# Patient Record
Sex: MALE | Race: BLACK OR AFRICAN AMERICAN | NOT HISPANIC OR LATINO
[De-identification: names, ages, dates, MRNs, and addresses within clinical notes are randomized per-mention and may not be internally consistent; named-entity substitution may affect disease eponyms.]

---

## 2020-09-17 PROBLEM — Z00.00 ENCOUNTER FOR PREVENTIVE HEALTH EXAMINATION: Status: ACTIVE | Noted: 2020-09-17

## 2020-10-06 ENCOUNTER — APPOINTMENT (OUTPATIENT)
Dept: GASTROENTEROLOGY | Facility: CLINIC | Age: 55
End: 2020-10-06
Payer: MEDICAID

## 2020-10-06 VITALS
HEART RATE: 74 BPM | HEIGHT: 76 IN | RESPIRATION RATE: 15 BRPM | DIASTOLIC BLOOD PRESSURE: 77 MMHG | SYSTOLIC BLOOD PRESSURE: 110 MMHG | WEIGHT: 215 LBS | OXYGEN SATURATION: 98 % | TEMPERATURE: 97.7 F | BODY MASS INDEX: 26.18 KG/M2

## 2020-10-06 DIAGNOSIS — Z86.010 PERSONAL HISTORY OF COLONIC POLYPS: ICD-10-CM

## 2020-10-06 DIAGNOSIS — Z72.89 OTHER PROBLEMS RELATED TO LIFESTYLE: ICD-10-CM

## 2020-10-06 PROCEDURE — 99203 OFFICE O/P NEW LOW 30 MIN: CPT

## 2020-10-06 NOTE — END OF VISIT
[] : Fellow [FreeTextEntry3] : Pt seen and examined, case d/w Dr. Johnson.  Pt has an AC polyp, will arrange for an EMR at [facility]. Risks including perforation and bleeding d/w the pt.

## 2020-10-06 NOTE — PHYSICAL EXAM
[General Appearance - Alert] : alert [General Appearance - In No Acute Distress] : in no acute distress [General Appearance - Well Nourished] : well nourished [General Appearance - Well Developed] : well developed [General Appearance - Well-Appearing] : healthy appearing [PERRL With Normal Accommodation] : pupils were equal in size, round, and reactive to light [Extraocular Movements] : extraocular movements were intact [Hearing Threshold Finger Rub Not Love] : hearing was normal [Examination Of The Oral Cavity] : the lips and gums were normal [Neck Cervical Mass (___cm)] : no neck mass was observed [Respiration, Rhythm And Depth] : normal respiratory rhythm and effort [Exaggerated Use Of Accessory Muscles For Inspiration] : no accessory muscle use [Heart Rate And Rhythm] : heart rate was normal and rhythm regular [Edema] : there was no peripheral edema [Veins - Varicosity Changes] : there were no varicosital changes [Abdomen Soft] : soft [Abdomen Tenderness] : non-tender [Abdomen Mass (___ Cm)] : no abdominal mass palpated [Involuntary Movements] : no involuntary movements were seen [Skin Turgor] : normal skin turgor [] : no rash [No Focal Deficits] : no focal deficits [Oriented To Time, Place, And Person] : oriented to person, place, and time [Impaired Insight] : insight and judgment were intact [Affect] : the affect was normal [Mood] : the mood was normal

## 2020-10-06 NOTE — HISTORY OF PRESENT ILLNESS
[de-identified] : Patient is a 54yM with no reported PMHx referred by Dr. Jerome for a complex polypectomy. Patient recently underwent his index screening colonoscopy on 9/16/2020 with removal of two small tubular adenomas and one 7-8cm sessile but mobile lesion in the ascending colon which was unable to be removed. The lesion is noted to be soft with superficial biopsies revealing tubular adenoma. Patient reports feeling well without GI complaints. No melena, hematochezia, weight changes. Patient used to be employed in [place] with a company that [employer], but lost his job early in the pandemic and returned to [place]. No known family history of GI malignancy, non-smoker, rare EtOH.

## 2020-10-06 NOTE — ASSESSMENT
[FreeTextEntry1] : 54yM referred by Dr. Jerome for complex polypectomy of ascending colon polyp\par \par 1. Ascending colon polyp - 7-8 cm lesion in the ascending colon, soft, mobile, with superficial biopsies revealing tubular adenoma seen on patient's index colonoscopy on 9/16/2020.\par - Discussed r/b/a/i of colonoscopic evaluation and likely EMR, patient amenable\par - Discussed need for bowel preparation, escort, preprocedural COVID testing, possible postprocedural hospitalization\par - Will plan for colonoscopy at [facility] with MiraLAX prep\par \par RTC as needed or after colonoscopy\par Patient seen and case discussed with attending physician

## 2020-11-05 ENCOUNTER — INPATIENT (INPATIENT)
Facility: HOSPITAL | Age: 55
LOS: 2 days | Discharge: HOME | DRG: 909 | End: 2020-11-08
Attending: SURGERY | Admitting: SURGERY
Payer: MEDICAID

## 2020-11-05 ENCOUNTER — APPOINTMENT (OUTPATIENT)
Dept: GASTROENTEROLOGY | Facility: HOSPITAL | Age: 55
End: 2020-11-05

## 2020-11-05 VITALS
DIASTOLIC BLOOD PRESSURE: 63 MMHG | OXYGEN SATURATION: 98 % | RESPIRATION RATE: 16 BRPM | TEMPERATURE: 96 F | HEART RATE: 94 BPM | SYSTOLIC BLOOD PRESSURE: 107 MMHG

## 2020-11-05 LAB
ANION GAP SERPL CALC-SCNC: 8 MMOL/L — SIGNIFICANT CHANGE UP (ref 5–17)
APTT BLD: 28.4 SEC — SIGNIFICANT CHANGE UP (ref 27.5–35.5)
BASE EXCESS BLDA CALC-SCNC: -1.2 MMOL/L — SIGNIFICANT CHANGE UP (ref -2–3)
BASE EXCESS BLDA CALC-SCNC: -5 MMOL/L — LOW (ref -2–3)
BLD GP AB SCN SERPL QL: NEGATIVE — SIGNIFICANT CHANGE UP
BUN SERPL-MCNC: 8 MG/DL — SIGNIFICANT CHANGE UP (ref 7–23)
CA-I BLDA-SCNC: 1.04 MMOL/L — LOW (ref 1.12–1.3)
CALCIUM SERPL-MCNC: 7.8 MG/DL — LOW (ref 8.4–10.5)
CHLORIDE SERPL-SCNC: 105 MMOL/L — SIGNIFICANT CHANGE UP (ref 96–108)
CO2 SERPL-SCNC: 23 MMOL/L — SIGNIFICANT CHANGE UP (ref 22–31)
COHGB MFR BLDA: 0.3 % — SIGNIFICANT CHANGE UP
CREAT SERPL-MCNC: 0.96 MG/DL — SIGNIFICANT CHANGE UP (ref 0.5–1.3)
GLUCOSE SERPL-MCNC: 123 MG/DL — HIGH (ref 70–99)
HCO3 BLDA-SCNC: 24 MMOL/L — SIGNIFICANT CHANGE UP (ref 21–28)
HCO3 BLDA-SCNC: 24 MMOL/L — SIGNIFICANT CHANGE UP (ref 21–28)
HCT VFR BLD CALC: 34.7 % — LOW (ref 39–50)
HCT VFR BLD CALC: 35.1 % — LOW (ref 39–50)
HCT VFR BLD CALC: 45.5 % — SIGNIFICANT CHANGE UP (ref 39–50)
HGB BLD-MCNC: 11.5 G/DL — LOW (ref 13–17)
HGB BLD-MCNC: 11.7 G/DL — LOW (ref 13–17)
HGB BLD-MCNC: 14.6 G/DL — SIGNIFICANT CHANGE UP (ref 13–17)
HGB BLDA-MCNC: 11.7 G/DL — LOW (ref 13–17)
INR BLD: 1.14 — SIGNIFICANT CHANGE UP (ref 0.88–1.16)
MAGNESIUM SERPL-MCNC: 1.5 MG/DL — LOW (ref 1.6–2.6)
MCHC RBC-ENTMCNC: 27.9 PG — SIGNIFICANT CHANGE UP (ref 27–34)
MCHC RBC-ENTMCNC: 28.6 PG — SIGNIFICANT CHANGE UP (ref 27–34)
MCHC RBC-ENTMCNC: 29 PG — SIGNIFICANT CHANGE UP (ref 27–34)
MCHC RBC-ENTMCNC: 32.1 GM/DL — SIGNIFICANT CHANGE UP (ref 32–36)
MCHC RBC-ENTMCNC: 33.1 GM/DL — SIGNIFICANT CHANGE UP (ref 32–36)
MCHC RBC-ENTMCNC: 33.3 GM/DL — SIGNIFICANT CHANGE UP (ref 32–36)
MCV RBC AUTO: 85.8 FL — SIGNIFICANT CHANGE UP (ref 80–100)
MCV RBC AUTO: 86.8 FL — SIGNIFICANT CHANGE UP (ref 80–100)
MCV RBC AUTO: 87.4 FL — SIGNIFICANT CHANGE UP (ref 80–100)
METHGB MFR BLDA: 0.3 % — SIGNIFICANT CHANGE UP
NRBC # BLD: 0 /100 WBCS — SIGNIFICANT CHANGE UP (ref 0–0)
O2 CT VFR BLDA CALC: 17.4 ML/DL — SIGNIFICANT CHANGE UP (ref 15–23)
OXYHGB MFR BLDA: 99 % — SIGNIFICANT CHANGE UP (ref 94–100)
PCO2 BLDA: 45 MMHG — SIGNIFICANT CHANGE UP (ref 35–48)
PCO2 BLDA: 64 MMHG — CRITICAL HIGH (ref 35–48)
PH BLDA: 7.19 — CRITICAL LOW (ref 7.35–7.45)
PH BLDA: 7.36 — SIGNIFICANT CHANGE UP (ref 7.35–7.45)
PLATELET # BLD AUTO: 181 K/UL — SIGNIFICANT CHANGE UP (ref 150–400)
PLATELET # BLD AUTO: 200 K/UL — SIGNIFICANT CHANGE UP (ref 150–400)
PLATELET # BLD AUTO: 299 K/UL — SIGNIFICANT CHANGE UP (ref 150–400)
PO2 BLDA: 415 MMHG — HIGH (ref 83–108)
PO2 BLDA: 85 MMHG — SIGNIFICANT CHANGE UP (ref 83–108)
POTASSIUM BLDA-SCNC: 4.7 MMOL/L — SIGNIFICANT CHANGE UP (ref 3.5–4.9)
POTASSIUM SERPL-MCNC: 4.3 MMOL/L — SIGNIFICANT CHANGE UP (ref 3.5–5.3)
POTASSIUM SERPL-SCNC: 4.3 MMOL/L — SIGNIFICANT CHANGE UP (ref 3.5–5.3)
PROTHROM AB SERPL-ACNC: 13.6 SEC — SIGNIFICANT CHANGE UP (ref 10.6–13.6)
RBC # BLD: 3.97 M/UL — LOW (ref 4.2–5.8)
RBC # BLD: 4.09 M/UL — LOW (ref 4.2–5.8)
RBC # BLD: 5.24 M/UL — SIGNIFICANT CHANGE UP (ref 4.2–5.8)
RBC # FLD: 16.1 % — HIGH (ref 10.3–14.5)
RBC # FLD: 16.1 % — HIGH (ref 10.3–14.5)
RBC # FLD: 16.8 % — HIGH (ref 10.3–14.5)
RH IG SCN BLD-IMP: POSITIVE — SIGNIFICANT CHANGE UP
SAO2 % BLDA: 100 % — SIGNIFICANT CHANGE UP (ref 95–100)
SAO2 % BLDA: 93 % — LOW (ref 95–100)
SARS-COV-2 N GENE NPH QL NAA+PROBE: NOT DETECTED
SODIUM BLDA-SCNC: 128 MMOL/L — LOW (ref 138–146)
SODIUM SERPL-SCNC: 136 MMOL/L — SIGNIFICANT CHANGE UP (ref 135–145)
WBC # BLD: 10.39 K/UL — SIGNIFICANT CHANGE UP (ref 3.8–10.5)
WBC # BLD: 5.66 K/UL — SIGNIFICANT CHANGE UP (ref 3.8–10.5)
WBC # BLD: 7.69 K/UL — SIGNIFICANT CHANGE UP (ref 3.8–10.5)
WBC # FLD AUTO: 10.39 K/UL — SIGNIFICANT CHANGE UP (ref 3.8–10.5)
WBC # FLD AUTO: 5.66 K/UL — SIGNIFICANT CHANGE UP (ref 3.8–10.5)
WBC # FLD AUTO: 7.69 K/UL — SIGNIFICANT CHANGE UP (ref 3.8–10.5)

## 2020-11-05 PROCEDURE — 45385 COLONOSCOPY W/LESION REMOVAL: CPT

## 2020-11-05 PROCEDURE — 99223 1ST HOSP IP/OBS HIGH 75: CPT | Mod: GC,25

## 2020-11-05 PROCEDURE — 99222 1ST HOSP IP/OBS MODERATE 55: CPT | Mod: GC

## 2020-11-05 PROCEDURE — 36248 INS CATH ABD/L-EXT ART ADDL: CPT

## 2020-11-05 PROCEDURE — 37244 VASC EMBOLIZE/OCCLUDE BLEED: CPT

## 2020-11-05 PROCEDURE — 36247 INS CATH ABD/L-EXT ART 3RD: CPT

## 2020-11-05 RX ORDER — ALBUMIN HUMAN 25 %
250 VIAL (ML) INTRAVENOUS ONCE
Refills: 0 | Status: DISCONTINUED | OUTPATIENT
Start: 2020-11-05 | End: 2020-11-06

## 2020-11-05 RX ORDER — INFLUENZA VIRUS VACCINE 15; 15; 15; 15 UG/.5ML; UG/.5ML; UG/.5ML; UG/.5ML
0.5 SUSPENSION INTRAMUSCULAR ONCE
Refills: 0 | Status: DISCONTINUED | OUTPATIENT
Start: 2020-11-05 | End: 2020-11-08

## 2020-11-05 RX ORDER — CHLORHEXIDINE GLUCONATE 213 G/1000ML
1 SOLUTION TOPICAL
Refills: 0 | Status: DISCONTINUED | OUTPATIENT
Start: 2020-11-05 | End: 2020-11-08

## 2020-11-05 RX ORDER — SODIUM CHLORIDE 9 MG/ML
1000 INJECTION, SOLUTION INTRAVENOUS
Refills: 0 | Status: DISCONTINUED | OUTPATIENT
Start: 2020-11-05 | End: 2020-11-06

## 2020-11-05 RX ORDER — MAGNESIUM SULFATE 500 MG/ML
2 VIAL (ML) INJECTION ONCE
Refills: 0 | Status: COMPLETED | OUTPATIENT
Start: 2020-11-05 | End: 2020-11-05

## 2020-11-05 RX ADMIN — SODIUM CHLORIDE 130 MILLILITER(S): 9 INJECTION, SOLUTION INTRAVENOUS at 19:28

## 2020-11-05 RX ADMIN — Medication 50 GRAM(S): at 20:24

## 2020-11-05 NOTE — H&P ADULT - NSHPLABSRESULTS_GEN_ALL_CORE
LABS:                        11.7   7.69  )-----------( 181      ( 05 Nov 2020 18:16 )             35.1     11-05    136  |  105  |  8   ----------------------------<  123<H>  4.3   |  23  |  0.96    Ca    7.8<L>      05 Nov 2020 18:16  Mg     1.5     11-05      PT/INR - ( 05 Nov 2020 18:16 )   PT: 13.6 sec;   INR: 1.14          PTT - ( 05 Nov 2020 18:16 )  PTT:28.4 sec      RADIOLOGY & ADDITIONAL STUDIES:

## 2020-11-05 NOTE — H&P ADULT - NSHPPHYSICALEXAM_GEN_ALL_CORE
Vital Signs Last 24 Hrs  T(C): 37.3 (05 Nov 2020 22:27), Max: 37.3 (05 Nov 2020 22:27)  T(F): 99.2 (05 Nov 2020 22:27), Max: 99.2 (05 Nov 2020 22:27)  HR: 98 (05 Nov 2020 22:00) (86 - 98)  BP: 97/66 (05 Nov 2020 19:11) (88/57 - 107/63)  BP(mean): 77 (05 Nov 2020 19:11) (65 - 79)  RR: 18 (05 Nov 2020 22:00) (14 - 28)  SpO2: 99% (05 Nov 2020 22:00) (97% - 100%)    PHYSICAL EXAM:  General: NAD, resting comfortably in bed  C/V: NSR  Pulm: Nonlabored breathing, no respiratory distress  Abd: soft, non-tender, non-distended, no rebound or guarding   Extrem: WWP, no edema, sheath site w/o hematoma  Pulses: palpable distal pulses

## 2020-11-05 NOTE — CONSULT NOTE ADULT - SUBJECTIVE AND OBJECTIVE BOX
GASTROENTEROLOGY CONSULT NOTE  HPI:  53 y/o M no significant pmh presented for complex polypectomy c/b brisk hemorrhage. Patient diagnosed with 7-8 cm polyp diagnosed on outpatient colonoscopy and presented for polypectomy. During the procedure the patient developed brisk hemorrhage at the polypectomy site. 1.5 L of bright blood noted in the suction canister. Pressure dropped significantly to 70s systolic, was promptly bolused w/o response and placed on phenylephrine. Became bradycardic to 20s, received atropine w/ return to HR in 70s and placed on Phenylephrine drip. CBC drawn relieved Hgb 14.6 however received 1 unit of PRBC given severe blood loss. Was intubated, areas of bleeding clipped and promptly transferred to IR for urgent embolization of bleeding noted in the right colon. After the procedure he no longer required pressors, was extubated, and transferred to the PACU.   (05 Nov 2020 17:46)    Allergies    No Known Allergies    Intolerances      Home Medications:    MEDICATIONS:  MEDICATIONS  (STANDING):  albumin human  5% IVPB 250 milliLiter(s) IV Intermittent Once  chlorhexidine 4% Liquid 1 Application(s) Topical <User Schedule>  influenza   Vaccine 0.5 milliLiter(s) IntraMuscular once  lactated ringers. 1000 milliLiter(s) (130 mL/Hr) IV Continuous <Continuous>    MEDICATIONS  (PRN):    PAST MEDICAL & SURGICAL HISTORY:    FAMILY HISTORY:    SOCIAL HISTORY:  As above    REVIEW OF SYSTEMS:  CONSTITUTIONAL: No weakness, fevers or chills  HEENT: No visual changes; No vertigo or throat pain   NECK: No pain or stiffness  RESPIRATORY: No cough, wheezing, hemoptysis; No shortness of breath  CARDIOVASCULAR: No chest pain or palpitations  GASTROINTESTINAL: As above  GENITOURINARY: No dysuria, frequency or hematuria  NEUROLOGICAL: No numbness or weakness  SKIN: No itching, burning, rashes, or lesions   All other 10 review of systems is negative unless indicated above.    Vital Signs Last 24 Hrs  T(C): 36.8 (05 Nov 2020 19:11), Max: 36.8 (05 Nov 2020 19:11)  T(F): 98.3 (05 Nov 2020 19:11), Max: 98.3 (05 Nov 2020 19:11)  HR: 90 (05 Nov 2020 21:00) (86 - 96)  BP: 97/66 (05 Nov 2020 19:11) (88/57 - 107/63)  BP(mean): 77 (05 Nov 2020 19:11) (65 - 79)  RR: 20 (05 Nov 2020 21:00) (14 - 28)  SpO2: 99% (05 Nov 2020 21:00) (97% - 100%)    11-05 @ 07:01  -  11-05 @ 21:11  --------------------------------------------------------  IN: 440 mL / OUT: 1450 mL / NET: -1010 mL        PHYSICAL EXAM:    General: Patient intubated and sedated at time of examination  Eyes: Anicteric sclerae, moist conjunctivae  HENT: ETT in place  Neck: Trachea midline, supple  Lungs: Normal respiratory effort, no intercostal retractions  Cardiovascular: RRR  Abdomen: Soft, non-tender non-distended; Normal bowel sounds; No rebound or guarding  Extremities: Normal range of motion, No clubbing, cyanosis or edema  Neurological: Sedated  Skin: Warm and dry. No obvious rash    LABS:                        11.7   7.69  )-----------( 181      ( 05 Nov 2020 18:16 )             35.1     11-05    136  |  105  |  8   ----------------------------<  123<H>  4.3   |  23  |  0.96    Ca    7.8<L>      05 Nov 2020 18:16  Mg     1.5     11-05          PT/INR - ( 05 Nov 2020 18:16 )   PT: 13.6 sec;   INR: 1.14          PTT - ( 05 Nov 2020 18:16 )  PTT:28.4 sec    RADIOLOGY & ADDITIONAL STUDIES:     Reviewed

## 2020-11-05 NOTE — CONSULT NOTE ADULT - ASSESSMENT
54yM who presented for outpatient complex polypectomy c/b acute hemorrhage/hemorrhagic shock s/p embolization with IR    1. Lower GI hemorrhage - Patient with large pedunculated ascending colon polyp with acute hemorrhage after attempted polypectomy. Several endoscopic hemoclips placed, but due to clinical deterioration and obscured view, IR and Colorectal Surgery consulted. Patient taken to IR for emergent angiography which demonstrated a blush in the ascending colon. Successful coil embolization performed.  - Maintain active T&S, large bore IV access  - Transfusion threshold per primary team    Recommendations discussed with primary team  Case discussed with attending physician

## 2020-11-05 NOTE — CONSULT NOTE ADULT - SUBJECTIVE AND OBJECTIVE BOX
HPI:  55 y/o M no significant pmh presented for complex polypectomy c/b brisk hemorrhage. Patient diagnosed with 7-8 cm polyp diagnosed on outpatient colonoscopy and presented for polypectomy. During the procedure the patient developed brisk hemorrhage at the polypectomy site. 1.5 L of bright blood noted in the suction canister. Pressure dropped significantly to 70s systolic, was promptly bolused w/o response and placed on phenylephrine. Became bradycardic to 20s, received atropine w/ return to HR in 70s and placed on Phenylephrine drip. CBC drawn relieved Hgb 14.6 however received 1 unit of PRBC given severe blood loss. Was intubated, areas of bleeding clipped and promptly transferred to IR for urgent embolization of bleeding noted in the right colon. After the procedure he no longer required pressors, was extubated, and transferred to the PACU.   (05 Nov 2020 17:46)      PAST MEDICAL & SURGICAL HISTORY:      ROS: See HPI    MEDICATIONS  (STANDING):  albumin human  5% IVPB 250 milliLiter(s) IV Intermittent Once    MEDICATIONS  (PRN):      Allergies    No Known Allergies    Intolerances        SOCIAL HISTORY:  Smoke: Never Smoker  EtOH: occasional    FAMILY HISTORY:      Vital Signs Last 24 Hrs  T(C): 35.5 (05 Nov 2020 17:45), Max: 35.5 (05 Nov 2020 17:45)  T(F): 95.9 (05 Nov 2020 17:45), Max: 95.9 (05 Nov 2020 17:45)  HR: 90 (05 Nov 2020 17:45) (90 - 96)  BP: 89/60 (05 Nov 2020 17:45) (89/60 - 107/63)  BP(mean): 69 (05 Nov 2020 17:45) (69 - 79)  RR: 16 (05 Nov 2020 17:45) (14 - 19)  SpO2: 100% (05 Nov 2020 17:45) (97% - 100%)    PHYSICAL EXAM    Gen: NAD   Neuro:   HEENT:   CV: RRR reg s1s2 no M  Pulm: CTA B/L no w/w/r  Abd: Soft, NT/ND  Ext: No C/C/E  Skin: No rashes erythema or ecchymosis  MSK: No joint swelling noted  Psych: Normal affect     LABS:                        14.6   5.66  )-----------( 299      ( 05 Nov 2020 14:52 )             45.5                 RADIOLOGY & ADDITIONAL STUDIES:    Assessment and Plan:  54Mwith no PMH, presented for elective polypectomy. During the procedure he had brisk bleed approx 1.5 L, became hypotensive, bolused 500cc, given 1u of PRBCs, started on phenylephrine and was intubated. Sent to IR. Admitted to SICU for hemodynamic monitoring    Neuro: no pain meds   CV: HD stable Repeat cbc q 6   Pulm: Satting well on NC  GI: NPO LGIB post polypectomy s/p IR embolization.   : Voids   ID: None  Endo: ISS   ppx: scd No SQH   Lines: PIV   Wounds: None   PT/OT: Not ordered

## 2020-11-05 NOTE — H&P ADULT - ASSESSMENT
55 y/o M no significant pmh s/p Colonoscopy w/ polypectomy c/b acute hemorrhage s/p IR coil embolization w/ blush noted in ascending colon. Extubated and clinically stable.     Admit to Dr. Storm, Team 1, ICU  NPO/IVF  Q6hr CBC  Active Type and Screen/ Large Bore IV  Transfuse to Hg > 7  Plan discussed with attending and chief resident

## 2020-11-06 LAB
ANION GAP SERPL CALC-SCNC: 10 MMOL/L — SIGNIFICANT CHANGE UP (ref 5–17)
BUN SERPL-MCNC: 8 MG/DL — SIGNIFICANT CHANGE UP (ref 7–23)
CALCIUM SERPL-MCNC: 8.2 MG/DL — LOW (ref 8.4–10.5)
CHLORIDE SERPL-SCNC: 104 MMOL/L — SIGNIFICANT CHANGE UP (ref 96–108)
CO2 SERPL-SCNC: 24 MMOL/L — SIGNIFICANT CHANGE UP (ref 22–31)
CREAT SERPL-MCNC: 0.83 MG/DL — SIGNIFICANT CHANGE UP (ref 0.5–1.3)
GLUCOSE SERPL-MCNC: 85 MG/DL — SIGNIFICANT CHANGE UP (ref 70–99)
HCT VFR BLD CALC: 31.5 % — LOW (ref 39–50)
HCT VFR BLD CALC: 31.9 % — LOW (ref 39–50)
HCT VFR BLD CALC: 32.2 % — LOW (ref 39–50)
HCT VFR BLD CALC: 32.3 % — LOW (ref 39–50)
HGB BLD-MCNC: 10.4 G/DL — LOW (ref 13–17)
HGB BLD-MCNC: 10.4 G/DL — LOW (ref 13–17)
HGB BLD-MCNC: 10.6 G/DL — LOW (ref 13–17)
HGB BLD-MCNC: 10.6 G/DL — LOW (ref 13–17)
MAGNESIUM SERPL-MCNC: 1.9 MG/DL — SIGNIFICANT CHANGE UP (ref 1.6–2.6)
MCHC RBC-ENTMCNC: 28.4 PG — SIGNIFICANT CHANGE UP (ref 27–34)
MCHC RBC-ENTMCNC: 28.8 PG — SIGNIFICANT CHANGE UP (ref 27–34)
MCHC RBC-ENTMCNC: 29.1 PG — SIGNIFICANT CHANGE UP (ref 27–34)
MCHC RBC-ENTMCNC: 29.1 PG — SIGNIFICANT CHANGE UP (ref 27–34)
MCHC RBC-ENTMCNC: 32.2 GM/DL — SIGNIFICANT CHANGE UP (ref 32–36)
MCHC RBC-ENTMCNC: 32.9 GM/DL — SIGNIFICANT CHANGE UP (ref 32–36)
MCHC RBC-ENTMCNC: 33 GM/DL — SIGNIFICANT CHANGE UP (ref 32–36)
MCHC RBC-ENTMCNC: 33.2 GM/DL — SIGNIFICANT CHANGE UP (ref 32–36)
MCV RBC AUTO: 87.5 FL — SIGNIFICANT CHANGE UP (ref 80–100)
MCV RBC AUTO: 87.6 FL — SIGNIFICANT CHANGE UP (ref 80–100)
MCV RBC AUTO: 88.2 FL — SIGNIFICANT CHANGE UP (ref 80–100)
MCV RBC AUTO: 88.3 FL — SIGNIFICANT CHANGE UP (ref 80–100)
NRBC # BLD: 0 /100 WBCS — SIGNIFICANT CHANGE UP (ref 0–0)
PHOSPHATE SERPL-MCNC: 3.3 MG/DL — SIGNIFICANT CHANGE UP (ref 2.5–4.5)
PLATELET # BLD AUTO: 187 K/UL — SIGNIFICANT CHANGE UP (ref 150–400)
PLATELET # BLD AUTO: 193 K/UL — SIGNIFICANT CHANGE UP (ref 150–400)
PLATELET # BLD AUTO: 195 K/UL — SIGNIFICANT CHANGE UP (ref 150–400)
PLATELET # BLD AUTO: 210 K/UL — SIGNIFICANT CHANGE UP (ref 150–400)
POTASSIUM SERPL-MCNC: 4 MMOL/L — SIGNIFICANT CHANGE UP (ref 3.5–5.3)
POTASSIUM SERPL-SCNC: 4 MMOL/L — SIGNIFICANT CHANGE UP (ref 3.5–5.3)
RBC # BLD: 3.57 M/UL — LOW (ref 4.2–5.8)
RBC # BLD: 3.64 M/UL — LOW (ref 4.2–5.8)
RBC # BLD: 3.66 M/UL — LOW (ref 4.2–5.8)
RBC # BLD: 3.68 M/UL — LOW (ref 4.2–5.8)
RBC # FLD: 16.2 % — HIGH (ref 10.3–14.5)
RBC # FLD: 16.2 % — HIGH (ref 10.3–14.5)
RBC # FLD: 16.3 % — HIGH (ref 10.3–14.5)
RBC # FLD: 16.4 % — HIGH (ref 10.3–14.5)
SARS-COV-2 IGG SERPL QL IA: POSITIVE
SARS-COV-2 IGM SERPL IA-ACNC: 98.2 INDEX — HIGH
SODIUM SERPL-SCNC: 138 MMOL/L — SIGNIFICANT CHANGE UP (ref 135–145)
WBC # BLD: 10.75 K/UL — HIGH (ref 3.8–10.5)
WBC # BLD: 11.71 K/UL — HIGH (ref 3.8–10.5)
WBC # BLD: 12.22 K/UL — HIGH (ref 3.8–10.5)
WBC # BLD: 9.44 K/UL — SIGNIFICANT CHANGE UP (ref 3.8–10.5)
WBC # FLD AUTO: 10.75 K/UL — HIGH (ref 3.8–10.5)
WBC # FLD AUTO: 11.71 K/UL — HIGH (ref 3.8–10.5)
WBC # FLD AUTO: 12.22 K/UL — HIGH (ref 3.8–10.5)
WBC # FLD AUTO: 9.44 K/UL — SIGNIFICANT CHANGE UP (ref 3.8–10.5)

## 2020-11-06 PROCEDURE — 99233 SBSQ HOSP IP/OBS HIGH 50: CPT | Mod: GC

## 2020-11-06 PROCEDURE — 71045 X-RAY EXAM CHEST 1 VIEW: CPT | Mod: 26

## 2020-11-06 RX ADMIN — SODIUM CHLORIDE 130 MILLILITER(S): 9 INJECTION, SOLUTION INTRAVENOUS at 06:56

## 2020-11-06 NOTE — PROGRESS NOTE ADULT - SUBJECTIVE AND OBJECTIVE BOX
INTERVAL/OVERNIGHT EVENTS:    11/6: another episode of dark-colored blood per rectum at 4am.CBC@noon ordered  O/N: had an episode of dark-colored blood, repeat CBC @ MN 11.5 (11.7)   11/5: Admitted to SICU after IR embolization    SUBJECTIVE: Pt reports 2 dark red BMs last night. Today has no complaints. No dizziness, headache, SOB, pain, nausea, vomiting.    Neurologic Medications    Respiratory Medications    Cardiovascular Medications    Gastrointestinal Medications  albumin human  5% IVPB 250 milliLiter(s) IV Intermittent Once  lactated ringers. 1000 milliLiter(s) IV Continuous <Continuous>    Genitourinary Medications    Hematologic/Oncologic Medications  influenza   Vaccine 0.5 milliLiter(s) IntraMuscular once    Antimicrobial/Immunologic Medications    Endocrine/Metabolic Medications    Topical/Other Medications  chlorhexidine 4% Liquid 1 Application(s) Topical <User Schedule>      MEDICATIONS  (PRN):      I&O's Detail    05 Nov 2020 07:01  -  06 Nov 2020 07:00  --------------------------------------------------------  IN:    IV PiggyBack: 50 mL    Lactated Ringers: 1690 mL  Total IN: 1740 mL    OUT:    Voided (mL): 2675 mL  Total OUT: 2675 mL    Total NET: -935 mL      06 Nov 2020 07:01  -  06 Nov 2020 11:58  --------------------------------------------------------  IN:    Lactated Ringers: 390 mL  Total IN: 390 mL    OUT:    Voided (mL): 300 mL  Total OUT: 300 mL    Total NET: 90 mL          Vital Signs Last 24 Hrs  T(C): 37.1 (06 Nov 2020 11:00), Max: 37.3 (05 Nov 2020 22:27)  T(F): 98.7 (06 Nov 2020 11:00), Max: 99.2 (05 Nov 2020 22:27)  HR: 96 (06 Nov 2020 11:00) (86 - 98)  BP: 111/67 (06 Nov 2020 11:00) (88/57 - 111/67)  BP(mean): 83 (06 Nov 2020 11:00) (65 - 83)  RR: 20 (06 Nov 2020 11:00) (14 - 28)  SpO2: 98% (06 Nov 2020 11:00) (95% - 100%)    GENERAL: NAD, resting comfortably in bed  HEENT: NCAT, MMM  C/V: Normal rate, normal peripheral perfusion  PULM: Nonlabored breathing, no respiratory distress,   ABD: Soft, ND, NT, no rebound tenderness, no guarding  EXTREM: WWP, no edema, SCDs in place  NEURO: No focal deficits    LABS:                        10.4   10.75 )-----------( 187      ( 06 Nov 2020 10:59 )             32.3     11-06    138  |  104  |  8   ----------------------------<  85  4.0   |  24  |  0.83    Ca    8.2<L>      06 Nov 2020 05:36  Phos  3.3     11-06  Mg     1.9     11-06      PT/INR - ( 05 Nov 2020 18:16 )   PT: 13.6 sec;   INR: 1.14          PTT - ( 05 Nov 2020 18:16 )  PTT:28.4 sec      RADIOLOGY & ADDITIONAL STUDIES:     INTERVAL/OVERNIGHT EVENTS:    11/6: another episode of dark-colored blood per rectum at 4am.CBC@noon ordered  O/N: had an episode of dark-colored blood, repeat CBC @ MN 11.5 (11.7)   11/5: Admitted to SICU after IR embolization    SUBJECTIVE: Pt reports 2 dark red BMs last night. Today has no complaints. No dizziness, headache, SOB, pain, nausea, vomiting.    Neurologic Medications    Respiratory Medications    Cardiovascular Medications    Gastrointestinal Medications  albumin human  5% IVPB 250 milliLiter(s) IV Intermittent Once  lactated ringers. 1000 milliLiter(s) IV Continuous <Continuous>    Genitourinary Medications    Hematologic/Oncologic Medications  influenza   Vaccine 0.5 milliLiter(s) IntraMuscular once    Antimicrobial/Immunologic Medications    Endocrine/Metabolic Medications    Topical/Other Medications  chlorhexidine 4% Liquid 1 Application(s) Topical <User Schedule>      MEDICATIONS  (PRN):      I&O's Detail    05 Nov 2020 07:01  -  06 Nov 2020 07:00  --------------------------------------------------------  IN:    IV PiggyBack: 50 mL    Lactated Ringers: 1690 mL  Total IN: 1740 mL    OUT:    Voided (mL): 2675 mL  Total OUT: 2675 mL    Total NET: -935 mL      06 Nov 2020 07:01  -  06 Nov 2020 11:58  --------------------------------------------------------  IN:    Lactated Ringers: 390 mL  Total IN: 390 mL    OUT:    Voided (mL): 300 mL  Total OUT: 300 mL    Total NET: 90 mL          Vital Signs Last 24 Hrs  T(C): 37.1 (06 Nov 2020 11:00), Max: 37.3 (05 Nov 2020 22:27)  T(F): 98.7 (06 Nov 2020 11:00), Max: 99.2 (05 Nov 2020 22:27)  HR: 96 (06 Nov 2020 11:00) (86 - 98)  BP: 111/67 (06 Nov 2020 11:00) (88/57 - 111/67)  BP(mean): 83 (06 Nov 2020 11:00) (65 - 83)  RR: 20 (06 Nov 2020 11:00) (14 - 28)  SpO2: 98% (06 Nov 2020 11:00) (95% - 100%)    GENERAL: NAD, resting comfortably in bed  HEENT: NCAT, MMM  C/V: Normal rate, normal peripheral perfusion  PULM: Nonlabored breathing, no respiratory distress,   ABD: Soft, ND, NT, no rebound tenderness, no guarding  Groin: R groin soft, incision c/d/i  EXTREM: WWP, no edema, SCDs in place  NEURO: No focal deficits    LABS:                        10.4   10.75 )-----------( 187      ( 06 Nov 2020 10:59 )             32.3     11-06    138  |  104  |  8   ----------------------------<  85  4.0   |  24  |  0.83    Ca    8.2<L>      06 Nov 2020 05:36  Phos  3.3     11-06  Mg     1.9     11-06      PT/INR - ( 05 Nov 2020 18:16 )   PT: 13.6 sec;   INR: 1.14          PTT - ( 05 Nov 2020 18:16 )  PTT:28.4 sec      RADIOLOGY & ADDITIONAL STUDIES:

## 2020-11-06 NOTE — PROGRESS NOTE ADULT - ASSESSMENT
54Mwith no PMH, presented for elective polypectomy. During the procedure he had brisk bleed approx 1.5 L, became hypotensive, bolused 500cc, given 1u of PRBCs, started on phenylephrine and was intubated. Sent to IR. Admitted to SICU for hemodynamic monitoring. Remained stable, Continued BRBPR per nursing staff, unwitnessed, f/u noon CBC    Neuro: no pain meds   CV: HD stable Repeat cbc q 6   Pulm: Satting well on RA  GI: Regular started today.   : Voids   ID: None  Endo: ISS   ppx: scd No SQH   Lines: PIV, L radial  Wounds: None   PT/OT: Not ordered   54Mwith no PMH, presented for elective polypectomy. During the procedure he had brisk bleed approx 1.5 L, became hypotensive, bolused 500cc, given 1u of PRBCs, started on phenylephrine and was intubated. Sent to IR for coil embolization. Admitted to SICU for hemodynamic monitoring. Remained stable, Continued BRBPR per nursing staff, unwitnessed, f/u CBC.    - F/u CBC  - Possible d/c if bleeding resolves and hemoglobin stabilizes  - Remainder of care per SICU  - Discussed with attending and chief resident

## 2020-11-06 NOTE — PROGRESS NOTE ADULT - ATTENDING COMMENTS
Hgb stable.  For likely d/c home today.  Pt to f/u with Dr. Storm in two weeks, will also arrange f/u with me.
Patient seen and examined with house-staff during bedside rounds  Resident note read, including vitals, physical findings, laboratory data, and radiological reports.   Revisions included below.  Case discussed with House staff  Direct personal management at bedside  and extensive interpretation of data. Decision making of high complexity.

## 2020-11-06 NOTE — PROGRESS NOTE ADULT - ASSESSMENT
54yM who presented for outpatient complex polypectomy c/b acute hemorrhage/hemorrhagic shock s/p embolization with IR    1. Lower GI hemorrhage - Patient with large pedunculated ascending colon polyp with acute hemorrhage after attempted polypectomy. Several endoscopic hemoclips placed, but due to clinical deterioration and obscured view, IR and Colorectal Surgery consulted. Patient taken to IR for emergent angiography which demonstrated a blush in the ascending colon. Successful coil embolization performed. Patient with stabilizations of vital signs with expected fluctuation in hemoglobin.  - Maintain active T&S, large bore IV access  - Transfusion threshold per primary team  - Trend CBC  - Advance diet    Recommendations discussed with primary team  Case discussed with attending physician

## 2020-11-06 NOTE — PROGRESS NOTE ADULT - SUBJECTIVE AND OBJECTIVE BOX
GASTROENTEROLOGY PROGRESS NOTE  Patient seen and examined at bedside. Patient reports feeling well. Endorses passage of some darker blood overnight, but is otherwise without complaints. No abdominal pain or distention, fevers, chills.    PERTINENT REVIEW OF SYSTEMS:  CONSTITUTIONAL: No weakness, fevers or chills  HEENT: No visual changes; No vertigo or throat pain   GASTROINTESTINAL: As above  NEUROLOGICAL: No numbness or weakness  SKIN: No itching, burning, rashes, or lesions     Allergies    No Known Allergies    Intolerances      MEDICATIONS:  MEDICATIONS  (STANDING):  albumin human  5% IVPB 250 milliLiter(s) IV Intermittent Once  chlorhexidine 4% Liquid 1 Application(s) Topical <User Schedule>  influenza   Vaccine 0.5 milliLiter(s) IntraMuscular once  lactated ringers. 1000 milliLiter(s) (130 mL/Hr) IV Continuous <Continuous>    MEDICATIONS  (PRN):    Vital Signs Last 24 Hrs  T(C): 37.1 (06 Nov 2020 06:08), Max: 37.3 (05 Nov 2020 22:27)  T(F): 98.8 (06 Nov 2020 06:08), Max: 99.2 (05 Nov 2020 22:27)  HR: 94 (06 Nov 2020 10:00) (86 - 98)  BP: 97/66 (05 Nov 2020 19:11) (88/57 - 107/63)  BP(mean): 77 (05 Nov 2020 19:11) (65 - 79)  RR: 18 (06 Nov 2020 08:00) (14 - 28)  SpO2: 98% (06 Nov 2020 10:00) (95% - 100%)    11-05 @ 07:01 - 11-06 @ 07:00  --------------------------------------------------------  IN: 1740 mL / OUT: 2675 mL / NET: -935 mL    11-06 @ 07:01  -  11-06 @ 10:30  --------------------------------------------------------  IN: 130 mL / OUT: 0 mL / NET: 130 mL      PHYSICAL EXAM:    General: Well developed; well nourished; in no acute distress  HEENT: MMM, conjunctiva and sclera clear  Gastrointestinal: Soft non-tender non-distended; Normal bowel sounds; No hepatosplenomegaly. No rebound or guarding  Skin: Warm and dry. No obvious rash    LABS:                        10.6   9.44  )-----------( 193      ( 06 Nov 2020 05:36 )             32.2     11-06    138  |  104  |  8   ----------------------------<  85  4.0   |  24  |  0.83    Ca    8.2<L>      06 Nov 2020 05:36  Phos  3.3     11-06  Mg     1.9     11-06      PT/INR - ( 05 Nov 2020 18:16 )   PT: 13.6 sec;   INR: 1.14          PTT - ( 05 Nov 2020 18:16 )  PTT:28.4 sec                  RADIOLOGY & ADDITIONAL STUDIES:  Reviewed

## 2020-11-06 NOTE — PROGRESS NOTE ADULT - SUBJECTIVE AND OBJECTIVE BOX
INTERVAL/OVERNIGHT EVENTS: Patient admitted to SICU yesterday after IR embolization. He had an episode of bleeding per rectum overnight but remained stable. Repeat Hgb was 11.5 from 11.7. AM Hgb is 10.6.    SUBJECTIVE:       SICU Day #2    Neurologic Medications    Respiratory Medications    Cardiovascular Medications    Gastrointestinal Medications  albumin human  5% IVPB 250 milliLiter(s) IV Intermittent Once  lactated ringers. 1000 milliLiter(s) IV Continuous <Continuous>    Genitourinary Medications    Hematologic/Oncologic Medications  influenza   Vaccine 0.5 milliLiter(s) IntraMuscular once    Antimicrobial/Immunologic Medications    Endocrine/Metabolic Medications    Topical/Other Medications  chlorhexidine 4% Liquid 1 Application(s) Topical <User Schedule>      MEDICATIONS  (PRN):      I&O's Detail    05 Nov 2020 07:01  -  06 Nov 2020 06:35  --------------------------------------------------------  IN:    IV PiggyBack: 50 mL    Lactated Ringers: 1690 mL  Total IN: 1740 mL    OUT:    Voided (mL): 2425 mL  Total OUT: 2425 mL    Total NET: -685 mL        Vital Signs Last 24 Hrs  T(C): 37.1 (06 Nov 2020 06:08), Max: 37.3 (05 Nov 2020 22:27)  T(F): 98.8 (06 Nov 2020 06:08), Max: 99.2 (05 Nov 2020 22:27)  HR: 89 (06 Nov 2020 06:00) (86 - 98)  BP: 97/66 (05 Nov 2020 19:11) (88/57 - 107/63)  BP(mean): 77 (05 Nov 2020 19:11) (65 - 79)  RR: 18 (06 Nov 2020 06:00) (14 - 28)  SpO2: 95% (06 Nov 2020 06:00) (95% - 100%)      Physical exam:  Gen: NAD   Neuro:   HEENT:   CV: RRR reg s1s2 no M  Pulm: CTA B/L no w/w/r  Abd: Soft, NT/ND  Ext: No C/C/E  Skin: No rashes erythema or ecchymosis  MSK: No joint swelling noted  Psych: Normal affect     LABS:                        10.6   9.44  )-----------( 193      ( 06 Nov 2020 05:36 )             32.2     11-06    138  |  104  |  8   ----------------------------<  85  4.0   |  24  |  0.83    Ca    8.2<L>      06 Nov 2020 05:36  Phos  3.3     11-06  Mg     1.9     11-06      PT/INR - ( 05 Nov 2020 18:16 )   PT: 13.6 sec;   INR: 1.14          PTT - ( 05 Nov 2020 18:16 )  PTT:28.4 sec           INTERVAL/OVERNIGHT EVENTS: Patient admitted to SICU yesterday after IR embolization. He had 2 episodes of bleeding per rectum overnight but remained stable. It was dark colored both times. Repeat Hgb was 11.5 from 11.7. AM Hgb is 10.6.    SUBJECTIVE: Patient reports 2 dark colored BMs overnight, one at midnight and one at 4pm. He has no complaints, and has no pain.    SICU Day #2    Neurologic Medications    Respiratory Medications    Cardiovascular Medications    Gastrointestinal Medications  albumin human  5% IVPB 250 milliLiter(s) IV Intermittent Once  lactated ringers. 1000 milliLiter(s) IV Continuous <Continuous>    Genitourinary Medications    Hematologic/Oncologic Medications  influenza   Vaccine 0.5 milliLiter(s) IntraMuscular once    Antimicrobial/Immunologic Medications    Endocrine/Metabolic Medications    Topical/Other Medications  chlorhexidine 4% Liquid 1 Application(s) Topical <User Schedule>      MEDICATIONS  (PRN):      I&O's Detail    05 Nov 2020 07:01  -  06 Nov 2020 06:35  --------------------------------------------------------  IN:    IV PiggyBack: 50 mL    Lactated Ringers: 1690 mL  Total IN: 1740 mL    OUT:    Voided (mL): 2425 mL  Total OUT: 2425 mL    Total NET: -685 mL        Vital Signs Last 24 Hrs  T(C): 37.1 (06 Nov 2020 06:08), Max: 37.3 (05 Nov 2020 22:27)  T(F): 98.8 (06 Nov 2020 06:08), Max: 99.2 (05 Nov 2020 22:27)  HR: 89 (06 Nov 2020 06:00) (86 - 98)  BP: 97/66 (05 Nov 2020 19:11) (88/57 - 107/63)  BP(mean): 77 (05 Nov 2020 19:11) (65 - 79)  RR: 18 (06 Nov 2020 06:00) (14 - 28)  SpO2: 95% (06 Nov 2020 06:00) (95% - 100%)      Physical exam:  Gen: NAD   Neuro: A7Ox3, no focal deficits  HEENT: NC/AT, EOMI, MMM  CV: RRR reg s1s2 no M  Pulm: CTA B/L, non-labored breathing on RA  Abd: Soft, NT/ND  Ext: warm, well-perfused, R groin access site with dressing, non-saturated, non-tender, no pulsatile masses noted  Skin: No rashes erythema or ecchymosis  MSK: No joint swelling noted  Psych: Normal affect     LABS:                        10.6   9.44  )-----------( 193      ( 06 Nov 2020 05:36 )             32.2     11-06    138  |  104  |  8   ----------------------------<  85  4.0   |  24  |  0.83    Ca    8.2<L>      06 Nov 2020 05:36  Phos  3.3     11-06  Mg     1.9     11-06      PT/INR - ( 05 Nov 2020 18:16 )   PT: 13.6 sec;   INR: 1.14          PTT - ( 05 Nov 2020 18:16 )  PTT:28.4 sec

## 2020-11-06 NOTE — PROGRESS NOTE ADULT - ASSESSMENT
54Mwith no PMH, presented for elective polypectomy. During the procedure he had brisk bleed approx 1.5 L, became hypotensive, bolused 500cc, given 1u of PRBCs, started on phenylephrine and was intubated. Sent to IR. Admitted to SICU for hemodynamic monitoring. Remained stable, no more episodes of bright red blood    Neuro: no pain meds   CV: HD stable Repeat cbc q 6   Pulm: Satting well on RA  GI: Regular started today.   : Voids   ID: None  Endo: ISS   ppx: scd No SQH   Lines: PIV, L radial  Wounds: None   PT/OT: Not ordered    DISPO: Discharge home if CBC stable, and f/u with Dr. Storm in 1-2 weeks

## 2020-11-07 ENCOUNTER — TRANSCRIPTION ENCOUNTER (OUTPATIENT)
Age: 55
End: 2020-11-07

## 2020-11-07 LAB
ANION GAP SERPL CALC-SCNC: 9 MMOL/L — SIGNIFICANT CHANGE UP (ref 5–17)
BLD GP AB SCN SERPL QL: NEGATIVE — SIGNIFICANT CHANGE UP
BUN SERPL-MCNC: 7 MG/DL — SIGNIFICANT CHANGE UP (ref 7–23)
CALCIUM SERPL-MCNC: 8.7 MG/DL — SIGNIFICANT CHANGE UP (ref 8.4–10.5)
CHLORIDE SERPL-SCNC: 104 MMOL/L — SIGNIFICANT CHANGE UP (ref 96–108)
CO2 SERPL-SCNC: 26 MMOL/L — SIGNIFICANT CHANGE UP (ref 22–31)
CREAT SERPL-MCNC: 0.89 MG/DL — SIGNIFICANT CHANGE UP (ref 0.5–1.3)
GLUCOSE SERPL-MCNC: 95 MG/DL — SIGNIFICANT CHANGE UP (ref 70–99)
HCT VFR BLD CALC: 30.9 % — LOW (ref 39–50)
HCT VFR BLD CALC: 31.8 % — LOW (ref 39–50)
HGB BLD-MCNC: 10.1 G/DL — LOW (ref 13–17)
HGB BLD-MCNC: 10.3 G/DL — LOW (ref 13–17)
MAGNESIUM SERPL-MCNC: 1.9 MG/DL — SIGNIFICANT CHANGE UP (ref 1.6–2.6)
MCHC RBC-ENTMCNC: 28.5 PG — SIGNIFICANT CHANGE UP (ref 27–34)
MCHC RBC-ENTMCNC: 28.7 PG — SIGNIFICANT CHANGE UP (ref 27–34)
MCHC RBC-ENTMCNC: 32.4 GM/DL — SIGNIFICANT CHANGE UP (ref 32–36)
MCHC RBC-ENTMCNC: 32.7 GM/DL — SIGNIFICANT CHANGE UP (ref 32–36)
MCV RBC AUTO: 87.8 FL — SIGNIFICANT CHANGE UP (ref 80–100)
MCV RBC AUTO: 87.8 FL — SIGNIFICANT CHANGE UP (ref 80–100)
NRBC # BLD: 0 /100 WBCS — SIGNIFICANT CHANGE UP (ref 0–0)
NRBC # BLD: 0 /100 WBCS — SIGNIFICANT CHANGE UP (ref 0–0)
PHOSPHATE SERPL-MCNC: 2.8 MG/DL — SIGNIFICANT CHANGE UP (ref 2.5–4.5)
PLATELET # BLD AUTO: 202 K/UL — SIGNIFICANT CHANGE UP (ref 150–400)
PLATELET # BLD AUTO: 213 K/UL — SIGNIFICANT CHANGE UP (ref 150–400)
POTASSIUM SERPL-MCNC: 3.9 MMOL/L — SIGNIFICANT CHANGE UP (ref 3.5–5.3)
POTASSIUM SERPL-SCNC: 3.9 MMOL/L — SIGNIFICANT CHANGE UP (ref 3.5–5.3)
RBC # BLD: 3.52 M/UL — LOW (ref 4.2–5.8)
RBC # BLD: 3.62 M/UL — LOW (ref 4.2–5.8)
RBC # FLD: 16 % — HIGH (ref 10.3–14.5)
RBC # FLD: 16.3 % — HIGH (ref 10.3–14.5)
RH IG SCN BLD-IMP: POSITIVE — SIGNIFICANT CHANGE UP
SODIUM SERPL-SCNC: 139 MMOL/L — SIGNIFICANT CHANGE UP (ref 135–145)
WBC # BLD: 11.61 K/UL — HIGH (ref 3.8–10.5)
WBC # BLD: 9.61 K/UL — SIGNIFICANT CHANGE UP (ref 3.8–10.5)
WBC # FLD AUTO: 11.61 K/UL — HIGH (ref 3.8–10.5)
WBC # FLD AUTO: 9.61 K/UL — SIGNIFICANT CHANGE UP (ref 3.8–10.5)

## 2020-11-07 PROCEDURE — 99231 SBSQ HOSP IP/OBS SF/LOW 25: CPT

## 2020-11-07 NOTE — PROGRESS NOTE ADULT - ASSESSMENT
A/P  Doing well clinically.  Hct stable multiple CBC's.  Stool is serosanguinous.  Will keep un until stool clears.    Allow diet as tolerated.  Encourage ambulation.  follow hct..  Track stools.

## 2020-11-07 NOTE — PROGRESS NOTE ADULT - ASSESSMENT
54Mwith no PMH, presented for elective polypectomy. During the procedure he had brisk bleed approx 1.5 L, became hypotensive, bolused 500cc, given 1u of PRBCs, started on phenylephrine and was intubated. Pt has remained stable since being stepped down to telemetry unit. Pt had 1 bloody BM followed by 2 non-bloody brown BM today. Repeated 2PM CBC within patient's typical range, no significant changes.     - F/u AM CBC  - Possible d/c if bleeding resolves and hemoglobin stabilizes  - Discussed with attending and chief resident

## 2020-11-07 NOTE — PROGRESS NOTE ADULT - SUBJECTIVE AND OBJECTIVE BOX
INTERVAL HPI/OVERNIGHT EVENTS:    STATUS POST:      POST OPERATIVE DAY #:     SUBJECTIVE:  Flatus: [ ] YES [ ] NO             Bowel Movement: [ ] YES [ ] NO  Pain (0-10):            Pain Control Adequate: [ ] YES [ ] NO  Nausea: [ ] YES [ ] NO            Vomiting: [ ] YES [ ] NO  Diarrhea: [ ] YES [ ] NO         Constipation: [ ] YES [ ] NO     Chest Pain: [ ] YES [ ] NO    SOB:  [ ] YES [ ] NO    MEDICATIONS  (STANDING):  chlorhexidine 4% Liquid 1 Application(s) Topical <User Schedule>  influenza   Vaccine 0.5 milliLiter(s) IntraMuscular once    MEDICATIONS  (PRN):      Vital Signs Last 24 Hrs  T(C): 36.9 (07 Nov 2020 13:30), Max: 37.4 (06 Nov 2020 16:05)  T(F): 98.5 (07 Nov 2020 13:30), Max: 99.4 (06 Nov 2020 16:05)  HR: 96 (07 Nov 2020 13:30) (65 - 110)  BP: 164/72 (07 Nov 2020 13:30) (99/60 - 164/72)  BP(mean): 78 (06 Nov 2020 18:00) (73 - 89)  RR: 18 (07 Nov 2020 13:30) (16 - 20)  SpO2: 97% (07 Nov 2020 13:30) (95% - 98%)    PHYSICAL EXAM:      Constitutional: A&Ox3    Breasts:    Respiratory: non labored breathing, no respiratory distress    Cardiovascular: NSR, RRR    Gastrointestinal:                 Incision:    Genitourinary:    Extremities: (-) edema                  I&O's Detail    06 Nov 2020 07:01  -  07 Nov 2020 07:00  --------------------------------------------------------  IN:    Lactated Ringers: 390 mL    Oral Fluid: 840 mL  Total IN: 1230 mL    OUT:    Voided (mL): 2050 mL  Total OUT: 2050 mL    Total NET: -820 mL      07 Nov 2020 07:01  -  07 Nov 2020 13:52  --------------------------------------------------------  IN:  Total IN: 0 mL    OUT:    Voided (mL): 400 mL  Total OUT: 400 mL    Total NET: -400 mL          LABS:                        10.1   9.61  )-----------( 202      ( 07 Nov 2020 06:07 )             30.9     11-07    139  |  104  |  7   ----------------------------<  95  3.9   |  26  |  0.89    Ca    8.7      07 Nov 2020 06:07  Phos  2.8     11-07  Mg     1.9     11-07      PT/INR - ( 05 Nov 2020 18:16 )   PT: 13.6 sec;   INR: 1.14          PTT - ( 05 Nov 2020 18:16 )  PTT:28.4 sec      RADIOLOGY & ADDITIONAL STUDIES: INTERVAL HPI/OVERNIGHT EVENTS: Pt states that he had a dark-red BM unwitnessed.     STATUS POST: Colonoscopy w/ polypectomy c/b acute hemorrhage s/p IR coil embolization w/ blush noted in ascending colon    SUBJECTIVE: Pt seen and examined bedside, no signs of acute distress present. Pt states that he is feeling well and hopeful to go home. -N/-V. +F/+BM.     MEDICATIONS  (STANDING):  chlorhexidine 4% Liquid 1 Application(s) Topical <User Schedule>  influenza   Vaccine 0.5 milliLiter(s) IntraMuscular once    MEDICATIONS  (PRN):      Vital Signs Last 24 Hrs  T(C): 36.9 (07 Nov 2020 13:30), Max: 37.4 (06 Nov 2020 16:05)  T(F): 98.5 (07 Nov 2020 13:30), Max: 99.4 (06 Nov 2020 16:05)  HR: 96 (07 Nov 2020 13:30) (65 - 110)  BP: 164/72 (07 Nov 2020 13:30) (99/60 - 164/72)  BP(mean): 78 (06 Nov 2020 18:00) (73 - 89)  RR: 18 (07 Nov 2020 13:30) (16 - 20)  SpO2: 97% (07 Nov 2020 13:30) (95% - 98%)    GENERAL: NAD, resting comfortably in bed  HEENT: NCAT, MMM  C/V: Normal rate, normal peripheral perfusion  PULM: Nonlabored breathing, no respiratory distress,   ABD: Soft, ND, NT, no rebound tenderness, no guarding  Groin: R groin soft, incision c/d/i  EXTREM: WWP, no edema, SCDs in place  NEURO: No focal deficits      I&O's Detail    06 Nov 2020 07:01  -  07 Nov 2020 07:00  --------------------------------------------------------  IN:    Lactated Ringers: 390 mL    Oral Fluid: 840 mL  Total IN: 1230 mL    OUT:    Voided (mL): 2050 mL  Total OUT: 2050 mL    Total NET: -820 mL      07 Nov 2020 07:01  -  07 Nov 2020 13:52  --------------------------------------------------------  IN:  Total IN: 0 mL    OUT:    Voided (mL): 400 mL  Total OUT: 400 mL    Total NET: -400 mL          LABS:                        10.1   9.61  )-----------( 202      ( 07 Nov 2020 06:07 )             30.9     11-07    139  |  104  |  7   ----------------------------<  95  3.9   |  26  |  0.89    Ca    8.7      07 Nov 2020 06:07  Phos  2.8     11-07  Mg     1.9     11-07      PT/INR - ( 05 Nov 2020 18:16 )   PT: 13.6 sec;   INR: 1.14          PTT - ( 05 Nov 2020 18:16 )  PTT:28.4 sec

## 2020-11-07 NOTE — PROGRESS NOTE ADULT - SUBJECTIVE AND OBJECTIVE BOX
Covering for Dr. Storm    55 yo man s/p attempted colonoscopic removal of polyp complicated by bleeding, transfusion, intubation, emergent angiogram and embolization of colonic vessel(s).  Patient extubated shortly after angiogram.    Patient been passing melanotic stools yesterday.  H/H been stable.    Patient had 2 BM's overnight. One was not seen and the other (the last one) was light serosanguinous without melena.    Patient is on regular diet that he is tolerating.  No N/V.  No abdominal pain.  Ambulating and voiding well.    Vital Signs Last 24 Hrs  T(C): 37.1 (07 Nov 2020 08:30), Max: 37.4 (06 Nov 2020 16:05)  T(F): 98.8 (07 Nov 2020 08:30), Max: 99.4 (06 Nov 2020 16:05)  HR: 65 (07 Nov 2020 08:30) (65 - 110)  BP: 110/70 (07 Nov 2020 08:30) (99/60 - 125/66)  BP(mean): 78 (06 Nov 2020 18:00) (72 - 89)  RR: 17 (07 Nov 2020 08:30) (16 - 20)  SpO2: 98% (07 Nov 2020 08:30) (95% - 99%)    lungs: clear  cor: S1S2  abd: not distended, BS +, non tender  ext: without calf tenderness    UO 1250 ml/12 hours; 2050 ml/last 24 hours    CBC Full  -  ( 07 Nov 2020 06:07 )  WBC Count : 9.61 K/uL  RBC Count : 3.52 M/uL  Hemoglobin : 10.1 g/dL  Hematocrit : 30.9 %  Platelet Count - Automated : 202 K/uL    11-07    139  |  104  |  7   ----------------------------<  95  3.9   |  26  |  0.89    Ca    8.7      07 Nov 2020 06:07  Phos  2.8     11-07  Mg     1.9     11-07

## 2020-11-07 NOTE — PROGRESS NOTE ADULT - ASSESSMENT
54yM who presented for outpatient complex polypectomy c/b acute hemorrhage/hemorrhagic shock s/p embolization with IR    1. Lower GI hemorrhage - Patient with large pedunculated ascending colon polyp with acute hemorrhage after attempted polypectomy. Several endoscopic hemoclips placed, but due to clinical deterioration and obscured view, IR and Colorectal Surgery consulted. Patient taken to IR for emergent angiography which demonstrated a blush in the ascending colon. Successful coil embolization performed. Patient with stabilizations of vital signs and hemoglobin.  - monitor CBC  - Diet as tolerated    Upon discharge follow up with Dr. Ruiz at [address] [place] [address], 4th floor ([phone])    Recommendations discussed with primary team  Case discussed with attending physician

## 2020-11-07 NOTE — PROGRESS NOTE ADULT - SUBJECTIVE AND OBJECTIVE BOX
GASTROENTEROLOGY PROGRESS NOTE  Patient seen and examined at bedside. No new events.    PERTINENT REVIEW OF SYSTEMS:  As noted above    Allergies    No Known Allergies    Intolerances      MEDICATIONS:  MEDICATIONS  (STANDING):  chlorhexidine 4% Liquid 1 Application(s) Topical <User Schedule>  influenza   Vaccine 0.5 milliLiter(s) IntraMuscular once    MEDICATIONS  (PRN):    Vital Signs Last 24 Hrs  T(C): 36.9 (07 Nov 2020 20:43), Max: 37.1 (07 Nov 2020 08:30)  T(F): 98.4 (07 Nov 2020 20:43), Max: 98.8 (07 Nov 2020 08:30)  HR: 89 (07 Nov 2020 20:43) (65 - 99)  BP: 119/71 (07 Nov 2020 20:43) (110/70 - 164/72)  BP(mean): --  RR: 18 (07 Nov 2020 20:43) (17 - 18)  SpO2: 97% (07 Nov 2020 20:43) (96% - 98%)    11-06 @ 07:01  -  11-07 @ 07:00  --------------------------------------------------------  IN: 1230 mL / OUT: 2050 mL / NET: -820 mL    11-07 @ 07:01  -  11-07 @ 20:52  --------------------------------------------------------  IN: 0 mL / OUT: 700 mL / NET: -700 mL      PHYSICAL EXAM:    General: Well developed; well nourished; in no acute distress  HEENT: MMM, conjunctiva and sclera clear  Gastrointestinal: Soft non-tender non-distended; Normal bowel sounds; No hepatosplenomegaly. No rebound or guarding  Skin: Warm and dry. No obvious rash    LABS:                        10.3   11.61 )-----------( 213      ( 07 Nov 2020 15:38 )             31.8     11-07    139  |  104  |  7   ----------------------------<  95  3.9   |  26  |  0.89    Ca    8.7      07 Nov 2020 06:07  Phos  2.8     11-07  Mg     1.9     11-07                        RADIOLOGY & ADDITIONAL STUDIES:  Reviewed

## 2020-11-08 ENCOUNTER — TRANSCRIPTION ENCOUNTER (OUTPATIENT)
Age: 55
End: 2020-11-08

## 2020-11-08 VITALS
DIASTOLIC BLOOD PRESSURE: 79 MMHG | OXYGEN SATURATION: 96 % | SYSTOLIC BLOOD PRESSURE: 128 MMHG | HEART RATE: 95 BPM | RESPIRATION RATE: 18 BRPM | TEMPERATURE: 98 F

## 2020-11-08 LAB
ANION GAP SERPL CALC-SCNC: 10 MMOL/L — SIGNIFICANT CHANGE UP (ref 5–17)
BUN SERPL-MCNC: 8 MG/DL — SIGNIFICANT CHANGE UP (ref 7–23)
CALCIUM SERPL-MCNC: 8.5 MG/DL — SIGNIFICANT CHANGE UP (ref 8.4–10.5)
CHLORIDE SERPL-SCNC: 103 MMOL/L — SIGNIFICANT CHANGE UP (ref 96–108)
CO2 SERPL-SCNC: 26 MMOL/L — SIGNIFICANT CHANGE UP (ref 22–31)
CREAT SERPL-MCNC: 0.86 MG/DL — SIGNIFICANT CHANGE UP (ref 0.5–1.3)
GLUCOSE SERPL-MCNC: 94 MG/DL — SIGNIFICANT CHANGE UP (ref 70–99)
HCT VFR BLD CALC: 31.3 % — LOW (ref 39–50)
HGB BLD-MCNC: 10.1 G/DL — LOW (ref 13–17)
MAGNESIUM SERPL-MCNC: 2 MG/DL — SIGNIFICANT CHANGE UP (ref 1.6–2.6)
MCHC RBC-ENTMCNC: 28.4 PG — SIGNIFICANT CHANGE UP (ref 27–34)
MCHC RBC-ENTMCNC: 32.3 GM/DL — SIGNIFICANT CHANGE UP (ref 32–36)
MCV RBC AUTO: 87.9 FL — SIGNIFICANT CHANGE UP (ref 80–100)
NRBC # BLD: 0 /100 WBCS — SIGNIFICANT CHANGE UP (ref 0–0)
PHOSPHATE SERPL-MCNC: 3.4 MG/DL — SIGNIFICANT CHANGE UP (ref 2.5–4.5)
PLATELET # BLD AUTO: 209 K/UL — SIGNIFICANT CHANGE UP (ref 150–400)
POTASSIUM SERPL-MCNC: 3.9 MMOL/L — SIGNIFICANT CHANGE UP (ref 3.5–5.3)
POTASSIUM SERPL-SCNC: 3.9 MMOL/L — SIGNIFICANT CHANGE UP (ref 3.5–5.3)
RBC # BLD: 3.56 M/UL — LOW (ref 4.2–5.8)
RBC # FLD: 15.9 % — HIGH (ref 10.3–14.5)
SODIUM SERPL-SCNC: 139 MMOL/L — SIGNIFICANT CHANGE UP (ref 135–145)
WBC # BLD: 9.1 K/UL — SIGNIFICANT CHANGE UP (ref 3.8–10.5)
WBC # FLD AUTO: 9.1 K/UL — SIGNIFICANT CHANGE UP (ref 3.8–10.5)

## 2020-11-08 PROCEDURE — 36415 COLL VENOUS BLD VENIPUNCTURE: CPT

## 2020-11-08 PROCEDURE — 80048 BASIC METABOLIC PNL TOTAL CA: CPT

## 2020-11-08 PROCEDURE — 86850 RBC ANTIBODY SCREEN: CPT

## 2020-11-08 PROCEDURE — 99231 SBSQ HOSP IP/OBS SF/LOW 25: CPT

## 2020-11-08 PROCEDURE — 84100 ASSAY OF PHOSPHORUS: CPT

## 2020-11-08 PROCEDURE — P9045: CPT

## 2020-11-08 PROCEDURE — 86901 BLOOD TYPING SEROLOGIC RH(D): CPT

## 2020-11-08 PROCEDURE — 36247 INS CATH ABD/L-EXT ART 3RD: CPT

## 2020-11-08 PROCEDURE — 37244 VASC EMBOLIZE/OCCLUDE BLEED: CPT

## 2020-11-08 PROCEDURE — C1889: CPT

## 2020-11-08 PROCEDURE — 86769 SARS-COV-2 COVID-19 ANTIBODY: CPT

## 2020-11-08 PROCEDURE — 36430 TRANSFUSION BLD/BLD COMPNT: CPT

## 2020-11-08 PROCEDURE — 85027 COMPLETE CBC AUTOMATED: CPT

## 2020-11-08 PROCEDURE — 86900 BLOOD TYPING SEROLOGIC ABO: CPT

## 2020-11-08 PROCEDURE — 85018 HEMOGLOBIN: CPT

## 2020-11-08 PROCEDURE — 71045 X-RAY EXAM CHEST 1 VIEW: CPT

## 2020-11-08 PROCEDURE — 85610 PROTHROMBIN TIME: CPT

## 2020-11-08 PROCEDURE — 82330 ASSAY OF CALCIUM: CPT

## 2020-11-08 PROCEDURE — C1769: CPT

## 2020-11-08 PROCEDURE — 83735 ASSAY OF MAGNESIUM: CPT

## 2020-11-08 PROCEDURE — 85730 THROMBOPLASTIN TIME PARTIAL: CPT

## 2020-11-08 PROCEDURE — 36248 INS CATH ABD/L-EXT ART ADDL: CPT

## 2020-11-08 PROCEDURE — 84295 ASSAY OF SERUM SODIUM: CPT

## 2020-11-08 PROCEDURE — C1887: CPT

## 2020-11-08 PROCEDURE — 82803 BLOOD GASES ANY COMBINATION: CPT

## 2020-11-08 PROCEDURE — 86920 COMPATIBILITY TEST SPIN: CPT

## 2020-11-08 PROCEDURE — P9016: CPT

## 2020-11-08 PROCEDURE — C1894: CPT

## 2020-11-08 PROCEDURE — 84132 ASSAY OF SERUM POTASSIUM: CPT

## 2020-11-08 NOTE — DISCHARGE NOTE PROVIDER - NSDCCPCAREPLAN_GEN_ALL_CORE_FT
PRINCIPAL DISCHARGE DIAGNOSIS  Diagnosis: Lower gastrointestinal bleed  Assessment and Plan of Treatment:

## 2020-11-08 NOTE — PROGRESS NOTE ADULT - REASON FOR ADMISSION
Acute Hemorrhage

## 2020-11-08 NOTE — PROVIDER CONTACT NOTE (OTHER) - ASSESSMENT
as per mds, team 1, pt stable for dc home. no rectal bleeding noted prior to dc home, liquid stool seen by md prior to dc home. pt aware of discharge plans  and discharge instructions.

## 2020-11-08 NOTE — DISCHARGE NOTE PROVIDER - NSDCFUADDAPPT_GEN_ALL_CORE_FT
Please follow up with GI (Dr. Ruiz) [facility] [address] ([phone]) in 1 week from discharge. You may follow up with your own gastroenterologist if you prefer.

## 2020-11-08 NOTE — DISCHARGE NOTE PROVIDER - NSDCFUADDINST_GEN_ALL_CORE_FT
Follow up with Dr. Storm in 1-2 weeks. Call the office at [phone] to schedule your appointment. You may resume a regular diet. You should be urinating at least 3-4x per day. Call the office if you experience increasing abdominal pain, nausea, vomiting, or temperature >101 F. Please report to the emergency department if you are experiencing rectal bleeding.    For pain control, you may take over the counter extra strength tylenol 1000mg Q6H as needed. Do not exceed 4000mg in 24 hrs.

## 2020-11-08 NOTE — DISCHARGE NOTE PROVIDER - HOSPITAL COURSE
***INCOMPLETE*** 53 y/o M no significant pmh presented for complex polypectomy c/b brisk hemorrhage. Patient diagnosed with 7-8 cm polyp diagnosed on outpatient colonoscopy and presented for polypectomy. During the procedure the patient developed brisk hemorrhage at the polypectomy site. 1.5 L of bright blood noted in the suction canister. Pressure dropped significantly to 70s systolic, was promptly bolused w/o response and placed on phenylephrine. Became bradycardic to 20s, received atropine w/ return to HR in 70s and placed on Phenylephrine drip. CBC drawn relieved Hgb 14.6 however received 1 unit of PRBC given severe blood loss. Was intubated, areas of bleeding clipped and promptly transferred to IR for urgent embolization w/ blush noted at the right colon. Following the procedure he no longer required pressors, was extubated, and transferred to the PACU.  Gastroenterology team was consulted for lower GI bleeding, recommendations appreciated. Pt began passing melanotic stools, surgery team continued to monitor vitals closely, trend CBC, monitor stools. 55 y/o M no significant pmh presented for complex polypectomy c/b brisk hemorrhage. Patient diagnosed with 7-8 cm polyp diagnosed on outpatient colonoscopy and presented for polypectomy. During the procedure the patient developed brisk hemorrhage at the polypectomy site. 1.5 L of bright blood noted in the suction canister. Pressure dropped significantly to 70s systolic, was promptly bolused w/o response and placed on phenylephrine. Became bradycardic to 20s, received atropine w/ return to HR in 70s and placed on Phenylephrine drip. CBC drawn relieved Hgb 14.6 however received 1 unit of PRBC given severe blood loss. Was intubated, areas of bleeding clipped and promptly transferred to IR for urgent embolization w/ blush noted at the right colon. Following the procedure he no longer required pressors, was extubated, and transferred to the PACU.  Gastroenterology team was consulted for lower GI bleeding, recommendations appreciated. Pt began passing melanotic stools, surgery team continued to monitor vitals closely, trend CBC, monitor stools. Pt clinically improved. At time of discharge pt is tolerating diet, having adequate bowel function without rectal bleeding, ambulating/voiding freely. Pt is hemodynamically stable and medically stable for discharge.

## 2020-11-08 NOTE — PROGRESS NOTE ADULT - ASSESSMENT
A/P  Doing well.  No further bleeding.  BM's blood free.  Hgb/Hct are stable/    Discharge home today  F/u with GI MD and Dr. Storm.  To call for problems.

## 2020-11-08 NOTE — PROGRESS NOTE ADULT - SUBJECTIVE AND OBJECTIVE BOX
No further BRBPR or melena.  Had few BM's minus blood.  Feels fine.  voiding well.  No abdominal pain.  On regular diet.    Vital Signs Last 24 Hrs  T(C): 36.6 (08 Nov 2020 08:54), Max: 37.1 (08 Nov 2020 00:25)  T(F): 97.9 (08 Nov 2020 08:54), Max: 98.7 (08 Nov 2020 00:25)  HR: 95 (08 Nov 2020 08:54) (86 - 99)  BP: 128/79 (08 Nov 2020 08:54) (116/69 - 164/72)  BP(mean): --  RR: 18 (08 Nov 2020 08:54) (18 - 18)  SpO2: 96% (08 Nov 2020 08:54) (96% - 98%)    abd: benign, soft, non tender                          10.1   9.10  )-----------( 209      ( 08 Nov 2020 06:17 )             31.3   11-08    139  |  103  |  8   ----------------------------<  94  3.9   |  26  |  0.86    Ca    8.5      08 Nov 2020 06:17  Phos  3.4     11-08  Mg     2.0     11-08

## 2020-11-08 NOTE — DISCHARGE NOTE PROVIDER - CARE PROVIDER_API CALL
Joshua Storm  COLON/RECTAL SURGERY  [address] [facility], [address]nd [place] [address]  Phone: [phone]  Fax: [phone]  Follow Up Time:     Suresh Ruiz  GASTROENTEROLOGY  [address], [address]th [place] [address]  Phone: [phone]  Fax: [phone]  Follow Up Time:

## 2020-11-08 NOTE — DISCHARGE NOTE NURSING/CASE MANAGEMENT/SOCIAL WORK - PATIENT PORTAL LINK FT
You can access the FollowMyHealth Patient Portal offered by [facility] by registering at the following website: [email or web address]. By joining [employer]’s FollowMyHealth portal, you will also be able to view your health information using other applications (apps) compatible with our system.

## 2020-11-08 NOTE — DISCHARGE NOTE PROVIDER - CARE PROVIDERS DIRECT ADDRESSES
[email or web address]

## 2020-11-08 NOTE — DISCHARGE NOTE NURSING/CASE MANAGEMENT/SOCIAL WORK - NSDCFUADDAPPT_GEN_ALL_CORE_FT
Please follow up with GI (Dr. Ruiz) at [facility] 4[address] ([phone]) in 1 week from discharge. You may follow up with your own gastroenterologist if you prefer.

## 2020-11-18 DIAGNOSIS — K91.840 POSTPROCEDURAL HEMORRHAGE OF A DIGESTIVE SYSTEM ORGAN OR STRUCTURE FOLLOWING A DIGESTIVE SYSTEM PROCEDURE: ICD-10-CM
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
## 2020-11-18 DIAGNOSIS — T81.19XA OTHER POSTPROCEDURAL SHOCK, INITIAL ENCOUNTER: ICD-10-CM

## 2020-11-30 PROBLEM — Z78.9 OTHER SPECIFIED HEALTH STATUS: Chronic | Status: ACTIVE | Noted: 2020-11-05

## 2020-12-04 ENCOUNTER — NON-APPOINTMENT (OUTPATIENT)
Age: 55
End: 2020-12-04

## 2020-12-24 ENCOUNTER — APPOINTMENT (OUTPATIENT)
Dept: GASTROENTEROLOGY | Facility: HOSPITAL | Age: 55
End: 2020-12-24

## 2021-01-04 DIAGNOSIS — Z01.812 ENCOUNTER FOR PREPROCEDURAL LABORATORY EXAMINATION: ICD-10-CM

## 2021-03-23 ENCOUNTER — APPOINTMENT (OUTPATIENT)
Dept: GASTROENTEROLOGY | Facility: CLINIC | Age: 56
End: 2021-03-23
Payer: MEDICAID

## 2021-03-23 VITALS
HEIGHT: 76 IN | DIASTOLIC BLOOD PRESSURE: 85 MMHG | WEIGHT: 212 LBS | HEART RATE: 92 BPM | OXYGEN SATURATION: 97 % | SYSTOLIC BLOOD PRESSURE: 118 MMHG | RESPIRATION RATE: 15 BRPM | BODY MASS INDEX: 25.82 KG/M2 | TEMPERATURE: 98.1 F

## 2021-03-23 DIAGNOSIS — D12.2 BENIGN NEOPLASM OF ASCENDING COLON: ICD-10-CM

## 2021-03-23 PROCEDURE — 99072 ADDL SUPL MATRL&STAF TM PHE: CPT

## 2021-03-23 PROCEDURE — 99213 OFFICE O/P EST LOW 20 MIN: CPT

## 2021-03-23 RX ORDER — CHROMIUM 200 MCG
TABLET ORAL
Refills: 0 | Status: ACTIVE | COMMUNITY

## 2021-03-23 RX ORDER — ASCORBIC ACID 500 MG
TABLET ORAL
Refills: 0 | Status: ACTIVE | COMMUNITY

## 2021-03-23 RX ORDER — IRON/IRON ASP GLY/FA/MV-MIN 38 125-25-1MG
TABLET ORAL
Refills: 0 | Status: ACTIVE | COMMUNITY

## 2021-03-23 RX ORDER — ASPIRIN 81 MG/1
81 TABLET, CHEWABLE ORAL
Refills: 0 | Status: ACTIVE | COMMUNITY

## 2021-03-23 NOTE — ASSESSMENT
[FreeTextEntry1] : 56 yo male with hx of a large pedunculated ascending colon polyp s/p partial polypectomy with snare cautery with profuse bleeding post-polypectomy tx'ed successfully with IR embolization\par \par - Repeat colonoscopy with possible polypectomy at  [facility]\par - D/w pt regarding COVID testing pre-procedure as well as escort post-procedure\par - Risks of the procedure including bleeding, perforation, etc discussed and stressed to the patient\par - Importance of f/u d/w pt and explained that there is a risk of cancer if the polyp is left\par - In addition explained that if polyp cannot be removed endoscopically would need surgical removal\par - Miralax split prep\par - Will check blood work

## 2021-03-23 NOTE — HISTORY OF PRESENT ILLNESS
[FreeTextEntry1] : 54 yo male with hx of a large pedunculated ascending colon polyp s/p partial polypectomy with snare cautery with profuse bleeding post-polypectomy tx'ed successfully with IR embolization here for f/u after multiple attempts at outreach.  No abdominal pain.  Still taking iron pills.  + dark stools with iron pills.  No BRBPR.  No heartburn or reflux.  Weight is stable.  Appetite is good.  When he lays on his belly and needs to get up he will get a pain in his lower back as he gets up (8/10), only when getting up after lying on his belly.  Erection problems -- pt self treated with Rhino pills he obtained from [employer].  \par \par No famhx of colon, stomach or pancreatic cancer.  No IBD.  \par \par [place], returned to his job.\par \par

## 2021-03-24 LAB
ANION GAP SERPL CALC-SCNC: 10 MMOL/L
BASOPHILS # BLD AUTO: 0.02 K/UL
BASOPHILS NFR BLD AUTO: 0.5 %
BUN SERPL-MCNC: 13 MG/DL
CALCIUM SERPL-MCNC: 9.9 MG/DL
CHLORIDE SERPL-SCNC: 104 MMOL/L
CO2 SERPL-SCNC: 25 MMOL/L
CREAT SERPL-MCNC: 1.03 MG/DL
EOSINOPHIL # BLD AUTO: 0.08 K/UL
EOSINOPHIL NFR BLD AUTO: 1.9 %
GLUCOSE SERPL-MCNC: 94 MG/DL
HCT VFR BLD CALC: 48.9 %
HGB BLD-MCNC: 15.9 G/DL
IMM GRANULOCYTES NFR BLD AUTO: 0.2 %
INR PPP: 0.96 RATIO
LYMPHOCYTES # BLD AUTO: 1.04 K/UL
LYMPHOCYTES NFR BLD AUTO: 25.3 %
MAN DIFF?: NORMAL
MCHC RBC-ENTMCNC: 28 PG
MCHC RBC-ENTMCNC: 32.5 GM/DL
MCV RBC AUTO: 86.1 FL
MONOCYTES # BLD AUTO: 0.47 K/UL
MONOCYTES NFR BLD AUTO: 11.4 %
NEUTROPHILS # BLD AUTO: 2.49 K/UL
NEUTROPHILS NFR BLD AUTO: 60.7 %
PLATELET # BLD AUTO: 327 K/UL
POTASSIUM SERPL-SCNC: 4.9 MMOL/L
PT BLD: 11.4 SEC
RBC # BLD: 5.68 M/UL
RBC # FLD: 18.2 %
SODIUM SERPL-SCNC: 140 MMOL/L
WBC # FLD AUTO: 4.11 K/UL

## 2021-04-20 ENCOUNTER — APPOINTMENT (OUTPATIENT)
Dept: GASTROENTEROLOGY | Facility: HOSPITAL | Age: 56
End: 2021-04-20

## 2023-06-10 ENCOUNTER — EMERGENCY (EMERGENCY)
Facility: HOSPITAL | Age: 58
LOS: 0 days | Discharge: HOME | End: 2023-06-10
Attending: STUDENT IN AN ORGANIZED HEALTH CARE EDUCATION/TRAINING PROGRAM
Payer: COMMERCIAL

## 2023-06-10 VITALS
TEMPERATURE: 98 F | DIASTOLIC BLOOD PRESSURE: 73 MMHG | RESPIRATION RATE: 17 BRPM | WEIGHT: 160.06 LBS | OXYGEN SATURATION: 97 % | HEIGHT: 76 IN | SYSTOLIC BLOOD PRESSURE: 110 MMHG | HEART RATE: 91 BPM

## 2023-06-10 VITALS
SYSTOLIC BLOOD PRESSURE: 135 MMHG | OXYGEN SATURATION: 99 % | RESPIRATION RATE: 18 BRPM | DIASTOLIC BLOOD PRESSURE: 86 MMHG | TEMPERATURE: 98 F | HEART RATE: 105 BPM

## 2023-06-10 DIAGNOSIS — S01.81XA LACERATION WITHOUT FOREIGN BODY OF OTHER PART OF HEAD, INITIAL ENCOUNTER: ICD-10-CM

## 2023-06-10 DIAGNOSIS — I45.10 UNSPECIFIED RIGHT BUNDLE-BRANCH BLOCK: ICD-10-CM
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
## 2023-06-10 DIAGNOSIS — Z23 ENCOUNTER FOR IMMUNIZATION: ICD-10-CM
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
## 2023-06-10 DIAGNOSIS — F10.129 ALCOHOL ABUSE WITH INTOXICATION, UNSPECIFIED: ICD-10-CM

## 2023-06-10 LAB
ALBUMIN SERPL ELPH-MCNC: 3.5 G/DL — SIGNIFICANT CHANGE UP (ref 3.3–5)
ALP SERPL-CCNC: 62 U/L — SIGNIFICANT CHANGE UP (ref 40–120)
ALT FLD-CCNC: 27 U/L — SIGNIFICANT CHANGE UP (ref 12–78)
ANION GAP SERPL CALC-SCNC: 6 MMOL/L — SIGNIFICANT CHANGE UP (ref 5–17)
AST SERPL-CCNC: 17 U/L — SIGNIFICANT CHANGE UP (ref 15–37)
BASOPHILS # BLD AUTO: 0.01 K/UL — SIGNIFICANT CHANGE UP (ref 0–0.2)
BASOPHILS NFR BLD AUTO: 0.2 % — SIGNIFICANT CHANGE UP (ref 0–2)
BILIRUB SERPL-MCNC: 0.4 MG/DL — SIGNIFICANT CHANGE UP (ref 0.2–1.2)
BUN SERPL-MCNC: 7 MG/DL — SIGNIFICANT CHANGE UP (ref 7–23)
CALCIUM SERPL-MCNC: 8.6 MG/DL — SIGNIFICANT CHANGE UP (ref 8.5–10.1)
CHLORIDE SERPL-SCNC: 101 MMOL/L — SIGNIFICANT CHANGE UP (ref 96–108)
CO2 SERPL-SCNC: 29 MMOL/L — SIGNIFICANT CHANGE UP (ref 22–31)
CREAT SERPL-MCNC: 1.13 MG/DL — SIGNIFICANT CHANGE UP (ref 0.5–1.3)
EGFR: 77 ML/MIN/1.73M2 — SIGNIFICANT CHANGE UP
EOSINOPHIL # BLD AUTO: 0.08 K/UL — SIGNIFICANT CHANGE UP (ref 0–0.5)
EOSINOPHIL NFR BLD AUTO: 1.7 % — SIGNIFICANT CHANGE UP (ref 0–6)
ETHANOL SERPL-MCNC: 276 MG/DL — HIGH (ref 0–10)
GLUCOSE SERPL-MCNC: 106 MG/DL — HIGH (ref 70–99)
HCT VFR BLD CALC: 46.4 % — SIGNIFICANT CHANGE UP (ref 39–50)
HGB BLD-MCNC: 15.1 G/DL — SIGNIFICANT CHANGE UP (ref 13–17)
IMM GRANULOCYTES NFR BLD AUTO: 0.4 % — SIGNIFICANT CHANGE UP (ref 0–0.9)
LIDOCAIN IGE QN: 129 U/L — SIGNIFICANT CHANGE UP (ref 73–393)
LYMPHOCYTES # BLD AUTO: 1.19 K/UL — SIGNIFICANT CHANGE UP (ref 1–3.3)
LYMPHOCYTES # BLD AUTO: 24.6 % — SIGNIFICANT CHANGE UP (ref 13–44)
MCHC RBC-ENTMCNC: 29.5 PG — SIGNIFICANT CHANGE UP (ref 27–34)
MCHC RBC-ENTMCNC: 32.5 G/DL — SIGNIFICANT CHANGE UP (ref 32–36)
MCV RBC AUTO: 90.6 FL — SIGNIFICANT CHANGE UP (ref 80–100)
MONOCYTES # BLD AUTO: 0.23 K/UL — SIGNIFICANT CHANGE UP (ref 0–0.9)
MONOCYTES NFR BLD AUTO: 4.8 % — SIGNIFICANT CHANGE UP (ref 2–14)
NEUTROPHILS # BLD AUTO: 3.3 K/UL — SIGNIFICANT CHANGE UP (ref 1.8–7.4)
NEUTROPHILS NFR BLD AUTO: 68.3 % — SIGNIFICANT CHANGE UP (ref 43–77)
NRBC # BLD: 0 /100 WBCS — SIGNIFICANT CHANGE UP (ref 0–0)
PLATELET # BLD AUTO: 240 K/UL — SIGNIFICANT CHANGE UP (ref 150–400)
POTASSIUM SERPL-MCNC: 3.6 MMOL/L — SIGNIFICANT CHANGE UP (ref 3.5–5.3)
POTASSIUM SERPL-SCNC: 3.6 MMOL/L — SIGNIFICANT CHANGE UP (ref 3.5–5.3)
PROT SERPL-MCNC: 7.8 GM/DL — SIGNIFICANT CHANGE UP (ref 6–8.3)
RBC # BLD: 5.12 M/UL — SIGNIFICANT CHANGE UP (ref 4.2–5.8)
RBC # FLD: 12.5 % — SIGNIFICANT CHANGE UP (ref 10.3–14.5)
SODIUM SERPL-SCNC: 136 MMOL/L — SIGNIFICANT CHANGE UP (ref 135–145)
WBC # BLD: 4.83 K/UL — SIGNIFICANT CHANGE UP (ref 3.8–10.5)
WBC # FLD AUTO: 4.83 K/UL — SIGNIFICANT CHANGE UP (ref 3.8–10.5)

## 2023-06-10 PROCEDURE — 93010 ELECTROCARDIOGRAM REPORT: CPT

## 2023-06-10 PROCEDURE — 70450 CT HEAD/BRAIN W/O DYE: CPT | Mod: 26,MA

## 2023-06-10 PROCEDURE — 99285 EMERGENCY DEPT VISIT HI MDM: CPT | Mod: 25

## 2023-06-10 PROCEDURE — 12013 RPR F/E/E/N/L/M 2.6-5.0 CM: CPT

## 2023-06-10 PROCEDURE — 99053 MED SERV 10PM-8AM 24 HR FAC: CPT

## 2023-06-10 RX ORDER — TETANUS TOXOID, REDUCED DIPHTHERIA TOXOID AND ACELLULAR PERTUSSIS VACCINE, ADSORBED 5; 2.5; 8; 8; 2.5 [IU]/.5ML; [IU]/.5ML; UG/.5ML; UG/.5ML; UG/.5ML
0.5 SUSPENSION INTRAMUSCULAR ONCE
Refills: 0 | Status: COMPLETED | OUTPATIENT
Start: 2023-06-10 | End: 2023-06-10

## 2023-06-10 RX ORDER — TETANUS TOXOID, REDUCED DIPHTHERIA TOXOID AND ACELLULAR PERTUSSIS VACCINE, ADSORBED 5; 2.5; 8; 8; 2.5 [IU]/.5ML; [IU]/.5ML; UG/.5ML; UG/.5ML; UG/.5ML
0.5 SUSPENSION INTRAMUSCULAR ONCE
Refills: 0 | Status: DISCONTINUED | OUTPATIENT
Start: 2023-06-10 | End: 2023-06-10

## 2023-06-10 RX ADMIN — TETANUS TOXOID, REDUCED DIPHTHERIA TOXOID AND ACELLULAR PERTUSSIS VACCINE, ADSORBED 0.5 MILLILITER(S): 5; 2.5; 8; 8; 2.5 SUSPENSION INTRAMUSCULAR at 05:18

## 2023-06-10 NOTE — ED PROVIDER NOTE - PATIENT PORTAL LINK FT
You can access the FollowMyHealth Patient Portal offered by [facility] by registering at the following website: [email or web address]. By joining [employer]’s FollowMyHealth portal, you will also be able to view your health information using other applications (apps) compatible with our system. You can access the FollowMyHealth Patient Portal offered by [facility] by registering at the following website: [email or web address]. By joining [employer]’s FollowMyHealth portal, you will also be able to view your health information using other applications (apps) compatible with our system. You can access the FollowMyHealth Patient Portal offered by [facility] by registering at the following website: [email or web address]. By joining [employer]’s FollowMyHealth portal, you will also be able to view your health information using other applications (apps) compatible with our system.
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
## 2023-06-10 NOTE — ED PROVIDER NOTE - PHYSICAL EXAMINATION
Gen: AOX3, NAD  Head: NCAT  ENT: Airway patent, moist mucous membranes, nasal passageways clear, no pharyngeal erythema or exudates, uvula midline, no cervical lymphadenopathy   Cardiac: Normal rate, normal rhythm, no murmurs/rubs/gallops appreciated  Respiratory: Lungs CTA B/L  Gastrointestinal: +BS, Abdomen soft, nontender, nondistended   MSK: No gross abnormalities, FROM of all four extremities, no edema,  no midline c--t-l spine ttp   HEME: Extremities warm, pulses intact and symmetrical in all four extremities  Skin: jagged 2-3cm chin lac   Neuro: No gross neurologic deficits, CN II-XII intact, no facial asymmetry, no dysarthria, dysdiadochokinesia, strength equal in all four extremities, no gait abnormality Gen: AOX3, NAD  Head: NCAT  ENT: Airway patent, moist mucous membranes, nasal passageways clear, no pharyngeal erythema or exudates, uvula midline, no cervical lymphadenopathy   Cardiac: Normal rate, normal rhythm  Respiratory: Lungs CTA B/L  Gastrointestinal: +BS, Abdomen soft, nontender, nondistended   MSK: No gross abnormalities, FROM of all four extremities, no edema,  no midline c--t-l spine ttp   HEME: Extremities warm, pulses intact and symmetrical in all four extremities  Skin: jagged 2-3cm chin lac   Neuro: No gross neurologic deficits, CN II-XII intact, no facial asymmetry, no dysarthria, strength equal in all four extremities, no gait abnormality

## 2023-06-10 NOTE — ED ADULT NURSE NOTE - NSFALLRISKINTERV_ED_ALL_ED
Assistance OOB with selected safe patient handling equipment if applicable/Assistance with ambulation/Communicate fall risk and risk factors to all staff, patient, and family/Monitor gait and stability/Monitor for mental status changes and reorient to person, place, and time, as needed/Provide visual cue: yellow wristband, yellow gown, etc/Reinforce activity limits and safety measures with patient and family/Toileting schedule using arm’s reach rule for commode and bathroom/Use of alarms - bed, stretcher, chair and/or video monitoring/Call bell, personal items and telephone in reach/Instruct patient to call for assistance before getting out of bed/chair/stretcher/Non-slip footwear applied when patient is off stretcher/[place] to call system/Physically safe environment - no spills, clutter or unnecessary equipment/Purposeful Proactive Rounding/Room/bathroom lighting operational, light cord in reach Assistance OOB with selected safe patient handling equipment if applicable/Assistance with ambulation/Communicate fall risk and risk factors to all staff, patient, and family/Monitor gait and stability/Monitor for mental status changes and reorient to person, place, and time, as needed/Provide visual cue: yellow wristband, yellow gown, etc/Reinforce activity limits and safety measures with patient and family/Toileting schedule using arm’s reach rule for commode and bathroom/Use of alarms - bed, stretcher, chair and/or video monitoring/Call bell, personal items and telephone in reach/Instruct patient to call for assistance before getting out of bed/chair/stretcher/Non-slip footwear applied when patient is off stretcher/[place] to call system/Physically safe environment - no spills, clutter or unnecessary equipment/Purposeful Proactive Rounding/Room/bathroom lighting operational, light cord in reach Assistance OOB with selected safe patient handling equipment if applicable/Assistance with ambulation/Communicate fall risk and risk factors to all staff, patient, and family/Monitor gait and stability/Monitor for mental status changes and reorient to person, place, and time, as needed/Provide visual cue: yellow wristband, yellow gown, etc/Reinforce activity limits and safety measures with patient and family/Toileting schedule using arm’s reach rule for commode and bathroom/Use of alarms - bed, stretcher, chair and/or video monitoring/Call bell, personal items and telephone in reach/Instruct patient to call for assistance before getting out of bed/chair/stretcher/Non-slip footwear applied when patient is off stretcher/[place] to call system/Physically safe environment - no spills, clutter or unnecessary equipment/Purposeful Proactive Rounding/Room/bathroom lighting operational, light cord in reach
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
## 2023-06-10 NOTE — ED ADULT TRIAGE NOTE - NS ED NURSE AMBULANCES
[facility]
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
## 2023-07-23 NOTE — PROCEDURE NOTE - NSINFORMCONSENT_GEN_A_CORE
father/Benefits, risks, and possible complications of procedure explained to patient/caregiver who verbalized understanding and gave verbal consent. Stable
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
## 2024-08-05 ENCOUNTER — NON-APPOINTMENT (OUTPATIENT)
Age: 59
End: 2024-08-05

## 2024-11-12 NOTE — PRE PROCEDURE NOTE - PRE PROCEDURE EVALUATION
VSS, all questions answered. Denies recent fever or illness. Pt states ready for procedure.     Procedure: GI angiogram with possible embolization    Indication: GI bleed s/p polypectomy and colonoscopy, request for embolization    Clinical History: colonoscopy s/p polypectomy with bleed      Meds:albumin human  5% IVPB 250 milliLiter(s) IV Intermittent Once      Allergies:No Known Allergies        Labs:                           14.6   5.66  )-----------( 299      ( 05 Nov 2020 14:52 )             45.5                 Anesthesia: general anesthesia    anticoagulants: none, no aspirin use    NPO: since midnight 11/4/2020